# Patient Record
Sex: FEMALE | Race: WHITE | URBAN - METROPOLITAN AREA
[De-identification: names, ages, dates, MRNs, and addresses within clinical notes are randomized per-mention and may not be internally consistent; named-entity substitution may affect disease eponyms.]

---

## 2022-03-03 ENCOUNTER — TELEPHONE (OUTPATIENT)
Dept: ENDOCRINOLOGY | Age: 57
End: 2022-03-03

## 2022-03-03 PROBLEM — E07.81 EUTHYROID SICK SYNDROME: Status: ACTIVE | Noted: 2022-03-03

## 2022-04-12 PROBLEM — E16.2 HYPOGLYCEMIA: Status: ACTIVE | Noted: 2022-04-12

## 2022-04-12 PROBLEM — I83.90 VARICOSE VEINS OF LOWER EXTREMITY: Status: ACTIVE | Noted: 2022-04-12

## 2022-04-12 PROBLEM — N84.1 POLYP AT CERVICAL OS: Status: ACTIVE | Noted: 2022-04-12

## 2022-04-12 PROBLEM — E78.5 HYPERLIPIDEMIA: Status: ACTIVE | Noted: 2017-05-03

## 2022-04-12 PROBLEM — R87.610 ATYPICAL SQUAMOUS CELLS OF UNDETERMINED SIGNIFICANCE (ASCUS) ON PAPANICOLAOU SMEAR OF CERVIX: Status: ACTIVE | Noted: 2022-04-12

## 2022-04-12 PROBLEM — J30.9 ALLERGIC RHINITIS: Status: ACTIVE | Noted: 2022-04-12

## 2022-04-12 PROBLEM — K62.5 PAINLESS RECTAL BLEEDING: Status: ACTIVE | Noted: 2022-04-12

## 2022-04-12 PROBLEM — M75.00 FROZEN SHOULDER SYNDROME: Status: ACTIVE | Noted: 2018-01-31

## 2022-04-12 PROBLEM — M25.512 LEFT SHOULDER PAIN: Status: ACTIVE | Noted: 2018-01-31

## 2022-04-12 PROBLEM — R53.83 FATIGUE: Status: ACTIVE | Noted: 2022-04-12

## 2022-04-12 RX ORDER — IBUPROFEN 800 MG/1
TABLET ORAL
COMMUNITY
End: 2022-04-13

## 2022-04-12 RX ORDER — AZITHROMYCIN 250 MG/1
TABLET, FILM COATED ORAL
COMMUNITY
End: 2022-04-13 | Stop reason: ALTCHOICE

## 2022-04-12 RX ORDER — CIPROFLOXACIN 500 MG/1
TABLET, FILM COATED ORAL
COMMUNITY
End: 2022-04-13 | Stop reason: ALTCHOICE

## 2022-04-12 RX ORDER — ORPHENADRINE CITRATE 100 MG/1
TABLET, EXTENDED RELEASE ORAL
COMMUNITY
End: 2022-04-13 | Stop reason: ALTCHOICE

## 2022-04-12 RX ORDER — DOXYCYCLINE HYCLATE 100 MG
TABLET ORAL
COMMUNITY
End: 2022-04-13 | Stop reason: ALTCHOICE

## 2022-04-12 RX ORDER — MUPIROCIN CALCIUM 20 MG/G
CREAM TOPICAL
COMMUNITY
End: 2022-04-13 | Stop reason: ALTCHOICE

## 2022-04-12 RX ORDER — HYDROCODONE BITARTRATE AND ACETAMINOPHEN 5; 325 MG/1; MG/1
TABLET ORAL
COMMUNITY
End: 2022-04-13 | Stop reason: ALTCHOICE

## 2022-04-12 RX ORDER — HYDROXYCHLOROQUINE SULFATE 200 MG/1
TABLET, FILM COATED ORAL
COMMUNITY
Start: 2022-01-29 | End: 2022-04-13 | Stop reason: ALTCHOICE

## 2022-04-12 NOTE — PROGRESS NOTES
Patient ID: Jesse Winter is a 61 y.o. female    Chief Complaint: Hashimoto's disease   Referred by BREN Jordan - [facility]     HPI:   Jesse Winter is here for initial evaluation of Hashimoto's disease     She developed pain in hands   TSH 2.48, Anti Tg abs high, TPO antibodies high - Feb 2022. T4, T3 normal. TSH not available - Feb 2022     Energy levels are perfect   Dry skin with itchiness. She has dry hair, stable. She is losing hair. Weight stable now. Lost 7-8 lbs due to sickness in Dec 2021   Wakes u at night and difficult to go back to sleep   She is always been cold . She did not develop hot flashes   Denies depression or anxiety, excessive sweating, tremors, palpitations    No neck pain, denies compressive neck symptoms   Menopause at age 46     Family history of thyroid disorder: None     No neck radiation  No Recent iodine loading in form of contrast material for diagnostic studies/cardiac cath  Hair/nail/skin gummies, Vit D , probiotic, vitamins for eyes, Magnesium, occasional ibuprofen if she has shoulder pain.  No other food supplements, herbs or medications including Biotin  No recent URTI    Denies smoking     The following portions of the patient's history were reviewed and updated as appropriate:       Family History   Problem Relation Age of Onset    No Known Problems Mother     Aortic Dissection Father     No Known Problems Brother     No Known Problems Maternal Grandmother     Parkinson's Disease Maternal Grandfather     No Known Problems Paternal Grandmother     Emphysema Paternal Grandfather             Social History     Socioeconomic History    Marital status:      Spouse name: Not on file    Number of children: Not on file    Years of education: Not on file    Highest education level: Not on file   Occupational History    Not on file   Tobacco Use    Smoking status: Never Smoker    Smokeless tobacco: Never Used   Vaping Use    Vaping Use: Never used   Substance and Sexual Activity    Alcohol use: Yes     Comment: only red wine on occ    Drug use: Never    Sexual activity: Not on file   Other Topics Concern    Not on file   Social History Narrative    Not on file     Social Determinants of Health     Financial Resource Strain:     Difficulty of Paying Living Expenses: Not on file   Food Insecurity:     Worried About Running Out of Food in the Last Year: Not on file    En[place] of Food in the Last Year: Not on file   Transportation Needs:     Lack of Transportation (Medical): Not on file    Lack of Transportation (Non-Medical): Not on file   Physical Activity:     Days of Exercise per Week: Not on file    Minutes of Exercise per Session: Not on file   Stress:     Feeling of Stress : Not on file   Social Connections:     Frequency of Communication with Friends and Family: Not on file    Frequency of Social Gatherings with Friends and Family: Not on file    Attends Rastafari Services: Not on file    Active Member of [address] [place] [address] or Organizations: Not on file    Attends Club or Organization Meetings: Not on file    Marital Status: Not on file   Intimate Partner Violence:     Fear of Current or Ex-Partner: Not on file    Emotionally Abused: Not on file    Physically Abused: Not on file    Sexually Abused: Not on file   Housing Stability:     Unable to Pay for Housing in the Last Year: Not on file    Number of [place] in the Last Year: Not on file    Unstable Housing in the Last Year: Not on file           Past Medical History:   Diagnosis Date    Sick-euthyroid syndrome          History reviewed. No pertinent surgical history. Allergies   Allergen Reactions    Sulfa Antibiotics Swelling     Other reaction(s): Respiratory Distress with facial swelling     Albumen, Egg      Digestive issues      Other      avacados, severe GI upset         No current outpatient medications on file. Review of Systems:  Constitutional: Negative for fever, chills. HENT: Negative for congestion, ear pain, rhinorrhea,  sore throat and trouble swallowing. Eyes: Negative for photophobia, redness, itching. Respiratory: Negative for cough, shortness of breath and sputum. Cardiovascular: Negative for chest pain and leg swelling. Gastrointestinal: Negative for nausea, vomiting, abdominal pain, diarrhea, constipation. Endocrine: Negative for polydipsia, polyphagia and polyuria. Genitourinary: Negative for dysuria, urgency, frequency, hematuria and flank pain. Musculoskeletal: Negative for myalgias, back pain, arthralgias. Skin/Nail: Negative for rash, itching. Normal nails. Neurological: Negative for seizures, light-headedness, numbness and headaches. Hematological/ Lymph nodes: Negative for adenopathy. Does not bruise/bleed easily. Psychiatric/Behavioral: Negative for suicidal ideas and decreased concentration. Physical Exam:  BP 97/67 (Site: Left Upper Arm, Position: Sitting, Cuff Size: Medium Adult)   Pulse 66   Ht 5' 5.5\" (1.664 m)   Wt 123 lb (55.8 kg)   SpO2 95%   BMI 20.16 kg/m²   Constitutional: Patient is oriented to person, place, and time. Patient appears well-developed and well-nourished. HENT:    Head: Normocephalic and atraumatic. Eyes: Conjunctivae and EOM are normal.     Neck: Normal range of motion. Thyroid normal.   Cardiovascular: Normal rate, regular rhythm and normal heart sounds. Pulmonary/Chest: Effort normal and breath sounds normal.   Musculoskeletal: Normal range of motion. Neurological: Patient is alert and oriented to person, place, and time. Patient has slow - normal reflexes. Skin: Skin is warm and dry. Psychiatric: Patient has a normal mood and affect. Patient behavior is normal.     Lab Review:    No results found for any previous visit. No results found. Assessment/Plan:     Winston Collazo was seen today for consultation and thyroid problem.     Diagnoses and all orders for this visit:    Hashimoto's disease  -     TSH; Future  -     T4, Free; Future  -     Celiac Screen with Reflex;  Future    Pain of right hand  -     [facility] - Mao Galaviz MD, Rheumatology, [facility]    Centromere antibody positive  -     Lyndsey Eagle MD, Rheumatology, [facility]        1: Hashimoto's disease     Clinically and biochemically euthyroid     No medicine indicated   Avoid processed diet     Recheck levels in 6 months     After that follow up wit pcp        2: Hand pain and anti-centromere abs   Refer to Rheum     3: Low normal weight   Check for celiac disease     Thyroid numbers, celiac disease in 6 months     RTC prn       Electronically signed by Benjamin Alvares MD on 4/13/2022 at 3:50 PM

## 2022-04-13 ENCOUNTER — OFFICE VISIT (OUTPATIENT)
Dept: ENDOCRINOLOGY | Age: 57
End: 2022-04-13
Payer: COMMERCIAL

## 2022-04-13 VITALS
DIASTOLIC BLOOD PRESSURE: 67 MMHG | SYSTOLIC BLOOD PRESSURE: 97 MMHG | HEIGHT: 66 IN | WEIGHT: 123 LBS | OXYGEN SATURATION: 95 % | HEART RATE: 66 BPM | BODY MASS INDEX: 19.77 KG/M2

## 2022-04-13 DIAGNOSIS — M79.641 PAIN OF RIGHT HAND: ICD-10-CM

## 2022-04-13 DIAGNOSIS — E06.3 HASHIMOTO'S DISEASE: Primary | ICD-10-CM

## 2022-04-13 DIAGNOSIS — R76.8 CENTROMERE ANTIBODY POSITIVE: ICD-10-CM

## 2022-04-13 PROCEDURE — 99204 OFFICE O/P NEW MOD 45 MIN: CPT | Performed by: INTERNAL MEDICINE

## 2022-10-19 ENCOUNTER — OFFICE VISIT (OUTPATIENT)
Dept: ENDOCRINOLOGY | Age: 57
End: 2022-10-19
Payer: COMMERCIAL

## 2022-10-19 VITALS
HEART RATE: 62 BPM | DIASTOLIC BLOOD PRESSURE: 63 MMHG | WEIGHT: 123.2 LBS | OXYGEN SATURATION: 99 % | BODY MASS INDEX: 19.8 KG/M2 | HEIGHT: 66 IN | SYSTOLIC BLOOD PRESSURE: 104 MMHG

## 2022-10-19 DIAGNOSIS — E06.3 HASHIMOTO'S DISEASE: ICD-10-CM

## 2022-10-19 DIAGNOSIS — M85.89 OSTEOPENIA OF MULTIPLE SITES: ICD-10-CM

## 2022-10-19 DIAGNOSIS — E06.3 HASHIMOTO'S DISEASE: Primary | ICD-10-CM

## 2022-10-19 DIAGNOSIS — E55.9 VITAMIN D DEFICIENCY: ICD-10-CM

## 2022-10-19 LAB
T3 FREE: 2.9 PG/ML (ref 2.3–4.2)
T4 FREE: 0.9 NG/DL (ref 0.9–1.8)
TSH SERPL DL<=0.05 MIU/L-ACNC: 2.23 UIU/ML (ref 0.27–4.2)
VITAMIN D 25-HYDROXY: 84.7 NG/ML

## 2022-10-19 PROCEDURE — 99214 OFFICE O/P EST MOD 30 MIN: CPT | Performed by: INTERNAL MEDICINE

## 2022-10-19 RX ORDER — ESTRADIOL 0.1 MG/G
CREAM VAGINAL
COMMUNITY
Start: 2022-06-15

## 2022-10-19 NOTE — PROGRESS NOTES
Patient ID: Edwin Field is a 61 y.o. female    Chief Complaint: Hashimoto's disease      HPI:   Edwin Field is here for an evaluation of Hashimoto's disease     Menopause at age 46   Family history of thyroid disorder: None   No neck radiation    She developed pain in hands   TSH 2.48, Anti Tg abs high, TPO antibodies high - Feb 2022. T4, T3 normal. TSH not available - Feb 2022     Energy levels are normal    Dry skin with itchiness. It is driving her crazy. She has dry hair, stable. She is losing hair. Weight stable    Sleeping better on Zyrtec and Magnesium    She is always been cold . She did not develop hot flashes   Denies depression or anxiety, excessive sweating, tremors, palpitations    No neck pain, denies compressive neck symptoms     No Recent iodine loading in form of contrast material for diagnostic studies/cardiac cath  Vit D, calcium , probiotic,  Magnesium.  No other food supplements, herbs or medications including Biotin  No recent URTI    Denies smoking     The following portions of the patient's history were reviewed and updated as appropriate:       Family History   Problem Relation Age of Onset    No Known Problems Mother     Aortic Dissection Father     No Known Problems Brother     No Known Problems Maternal Grandmother     Parkinson's Disease Maternal Grandfather     No Known Problems Paternal Grandmother     Emphysema Paternal Grandfather             Social History     Socioeconomic History    Marital status:      Spouse name: Not on file    Number of children: Not on file    Years of education: Not on file    Highest education level: Not on file   Occupational History    Not on file   Tobacco Use    Smoking status: Never    Smokeless tobacco: Never   Vaping Use    Vaping Use: Never used   Substance and Sexual Activity    Alcohol use: Yes     Comment: only red wine on occ    Drug use: Never    Sexual activity: Not on file   Other Topics Concern    Not on file   Social History Narrative    Not on file     Social Determinants of Health     Financial Resource Strain: Not on file   Food Insecurity: Not on file   Transportation Needs: Not on file   Physical Activity: Not on file   Stress: Not on file   Social Connections: Not on file   Intimate Partner Violence: Not on file   Housing Stability: Not on file           Past Medical History:   Diagnosis Date    Sick-euthyroid syndrome          No past surgical history on file. Allergies   Allergen Reactions    Sulfa Antibiotics Swelling     Other reaction(s): Respiratory Distress with facial swelling     Albumen, Egg      Digestive issues      Other      avacados, severe GI upset         No current outpatient medications on file. Review of Systems:  Constitutional: Negative for fever, chills. HENT: Negative for congestion, ear pain, rhinorrhea,  sore throat and trouble swallowing. Eyes: Negative for photophobia, redness, itching. Respiratory: Negative for cough, shortness of breath and sputum. Cardiovascular: Negative for chest pain and leg swelling. Gastrointestinal: Negative for nausea, vomiting, abdominal pain, diarrhea, constipation. Endocrine: Negative for polydipsia, polyphagia and polyuria. Genitourinary: Negative for dysuria, urgency, frequency, hematuria and flank pain. Musculoskeletal: Negative for myalgias, back pain, arthralgias. Skin/Nail: Negative for rash, itching. Normal nails. Neurological: Negative for seizures, light-headedness, numbness and headaches. Hematological/ Lymph nodes: Negative for adenopathy. Does not bruise/bleed easily. Psychiatric/Behavioral: Negative for suicidal ideas and decreased concentration. Physical Exam:  There were no vitals taken for this visit. Constitutional: Patient is oriented to person, place, and time. Patient appears well-developed and well-nourished. HENT:    Head: Normocephalic and atraumatic.      Eyes: Conjunctivae and EOM are normal.

## 2022-10-21 LAB — TISSUE TRANSGLUTAMINASE IGA: <0.5 U/ML (ref 0–14)
